# Patient Record
Sex: FEMALE | Race: BLACK OR AFRICAN AMERICAN | Employment: FULL TIME | URBAN - METROPOLITAN AREA
[De-identification: names, ages, dates, MRNs, and addresses within clinical notes are randomized per-mention and may not be internally consistent; named-entity substitution may affect disease eponyms.]

---

## 2020-09-15 ENCOUNTER — APPOINTMENT (OUTPATIENT)
Dept: ULTRASOUND IMAGING | Age: 35
End: 2020-09-15
Payer: MEDICAID

## 2020-09-15 ENCOUNTER — HOSPITAL ENCOUNTER (EMERGENCY)
Age: 35
Discharge: HOME | End: 2020-09-15
Attending: STUDENT IN AN ORGANIZED HEALTH CARE EDUCATION/TRAINING PROGRAM
Payer: MEDICAID

## 2020-09-15 ENCOUNTER — APPOINTMENT (OUTPATIENT)
Dept: CT IMAGING | Age: 35
End: 2020-09-15
Payer: MEDICAID

## 2020-09-15 VITALS
OXYGEN SATURATION: 98 % | HEART RATE: 84 BPM | TEMPERATURE: 98.2 F | RESPIRATION RATE: 17 BRPM | SYSTOLIC BLOOD PRESSURE: 127 MMHG | DIASTOLIC BLOOD PRESSURE: 87 MMHG

## 2020-09-15 LAB
ALBUMIN SERPL-MCNC: 4.5 G/DL (ref 3.4–5)
ALP BLD-CCNC: 46 U/L (ref 40–129)
ALT SERPL-CCNC: <5 U/L (ref 10–40)
ANION GAP SERPL CALCULATED.3IONS-SCNC: 12 MMOL/L (ref 3–16)
AST SERPL-CCNC: 15 U/L (ref 15–37)
BACTERIA: ABNORMAL /HPF
BASE EXCESS VENOUS: 1.1 MMOL/L (ref -3–3)
BASOPHILS ABSOLUTE: 0.1 K/UL (ref 0–0.2)
BASOPHILS RELATIVE PERCENT: 0.8 %
BILIRUB SERPL-MCNC: <0.2 MG/DL (ref 0–1)
BILIRUBIN DIRECT: <0.2 MG/DL (ref 0–0.3)
BILIRUBIN URINE: NEGATIVE
BILIRUBIN, INDIRECT: ABNORMAL MG/DL (ref 0–1)
BLOOD, URINE: NEGATIVE
BUN BLDV-MCNC: 10 MG/DL (ref 7–20)
CALCIUM SERPL-MCNC: 9.1 MG/DL (ref 8.3–10.6)
CHLORIDE BLD-SCNC: 101 MMOL/L (ref 99–110)
CLARITY: CLEAR
CO2: 26 MMOL/L (ref 21–32)
COLOR: YELLOW
CREAT SERPL-MCNC: <0.5 MG/DL (ref 0.6–1.1)
EOSINOPHILS ABSOLUTE: 0 K/UL (ref 0–0.6)
EOSINOPHILS RELATIVE PERCENT: 0.4 %
EPITHELIAL CELLS, UA: ABNORMAL /HPF (ref 0–5)
GFR AFRICAN AMERICAN: >60
GFR NON-AFRICAN AMERICAN: >60
GLUCOSE BLD-MCNC: 98 MG/DL (ref 70–99)
GLUCOSE URINE: NEGATIVE MG/DL
HCG QUALITATIVE: NEGATIVE
HCO3 VENOUS: 26.8 MMOL/L (ref 23–29)
HCT VFR BLD CALC: 34.2 % (ref 36–48)
HEMOGLOBIN: 11.4 G/DL (ref 12–16)
KETONES, URINE: NEGATIVE MG/DL
LACTIC ACID: 0.9 MMOL/L (ref 0.4–2)
LEUKOCYTE ESTERASE, URINE: ABNORMAL
LIPASE: 22 U/L (ref 13–60)
LYMPHOCYTES ABSOLUTE: 1.3 K/UL (ref 1–5.1)
LYMPHOCYTES RELATIVE PERCENT: 18.2 %
MCH RBC QN AUTO: 29 PG (ref 26–34)
MCHC RBC AUTO-ENTMCNC: 33.4 G/DL (ref 31–36)
MCV RBC AUTO: 86.9 FL (ref 80–100)
MICROSCOPIC EXAMINATION: YES
MONOCYTES ABSOLUTE: 0.3 K/UL (ref 0–1.3)
MONOCYTES RELATIVE PERCENT: 4.9 %
MUCUS: ABNORMAL /LPF
NEUTROPHILS ABSOLUTE: 5.3 K/UL (ref 1.7–7.7)
NEUTROPHILS RELATIVE PERCENT: 75.7 %
NITRITE, URINE: NEGATIVE
O2 SAT, VEN: 63 %
O2 THERAPY: ABNORMAL
PCO2, VEN: 49.1 MMHG (ref 40–50)
PDW BLD-RTO: 17 % (ref 12.4–15.4)
PH UA: 5.5 (ref 5–8)
PH VENOUS: 7.34 (ref 7.35–7.45)
PLATELET # BLD: 304 K/UL (ref 135–450)
PMV BLD AUTO: 8.3 FL (ref 5–10.5)
PO2, VEN: 34.9 MMHG (ref 25–40)
POTASSIUM REFLEX MAGNESIUM: 4.3 MMOL/L (ref 3.5–5.1)
PROTEIN UA: NEGATIVE MG/DL
RBC # BLD: 3.93 M/UL (ref 4–5.2)
RBC UA: ABNORMAL /HPF (ref 0–4)
SODIUM BLD-SCNC: 139 MMOL/L (ref 136–145)
SPECIFIC GRAVITY UA: >=1.03 (ref 1–1.03)
TCO2 CALC VENOUS: 28 MMOL/L
TOTAL PROTEIN: 7.7 G/DL (ref 6.4–8.2)
URINE TRICHOMONAS EVALUATION: NORMAL
URINE TYPE: ABNORMAL
UROBILINOGEN, URINE: 0.2 E.U./DL
WBC # BLD: 7 K/UL (ref 4–11)
WBC UA: ABNORMAL /HPF (ref 0–5)

## 2020-09-15 PROCEDURE — 96372 THER/PROPH/DIAG INJ SC/IM: CPT

## 2020-09-15 PROCEDURE — 76856 US EXAM PELVIC COMPLETE: CPT

## 2020-09-15 PROCEDURE — 83605 ASSAY OF LACTIC ACID: CPT

## 2020-09-15 PROCEDURE — 81001 URINALYSIS AUTO W/SCOPE: CPT

## 2020-09-15 PROCEDURE — 84703 CHORIONIC GONADOTROPIN ASSAY: CPT

## 2020-09-15 PROCEDURE — 87591 N.GONORRHOEAE DNA AMP PROB: CPT

## 2020-09-15 PROCEDURE — 76830 TRANSVAGINAL US NON-OB: CPT

## 2020-09-15 PROCEDURE — 80076 HEPATIC FUNCTION PANEL: CPT

## 2020-09-15 PROCEDURE — 2580000003 HC RX 258: Performed by: STUDENT IN AN ORGANIZED HEALTH CARE EDUCATION/TRAINING PROGRAM

## 2020-09-15 PROCEDURE — 82803 BLOOD GASES ANY COMBINATION: CPT

## 2020-09-15 PROCEDURE — 6370000000 HC RX 637 (ALT 250 FOR IP): Performed by: STUDENT IN AN ORGANIZED HEALTH CARE EDUCATION/TRAINING PROGRAM

## 2020-09-15 PROCEDURE — 80048 BASIC METABOLIC PNL TOTAL CA: CPT

## 2020-09-15 PROCEDURE — 83690 ASSAY OF LIPASE: CPT

## 2020-09-15 PROCEDURE — 99284 EMERGENCY DEPT VISIT MOD MDM: CPT

## 2020-09-15 PROCEDURE — 74177 CT ABD & PELVIS W/CONTRAST: CPT

## 2020-09-15 PROCEDURE — 36415 COLL VENOUS BLD VENIPUNCTURE: CPT

## 2020-09-15 PROCEDURE — 85025 COMPLETE CBC W/AUTO DIFF WBC: CPT

## 2020-09-15 PROCEDURE — 87491 CHLMYD TRACH DNA AMP PROBE: CPT

## 2020-09-15 PROCEDURE — 6360000004 HC RX CONTRAST MEDICATION: Performed by: STUDENT IN AN ORGANIZED HEALTH CARE EDUCATION/TRAINING PROGRAM

## 2020-09-15 PROCEDURE — 6360000002 HC RX W HCPCS: Performed by: STUDENT IN AN ORGANIZED HEALTH CARE EDUCATION/TRAINING PROGRAM

## 2020-09-15 RX ORDER — CEFTRIAXONE SODIUM 250 MG/1
250 INJECTION, POWDER, FOR SOLUTION INTRAMUSCULAR; INTRAVENOUS ONCE
Status: COMPLETED | OUTPATIENT
Start: 2020-09-15 | End: 2020-09-15

## 2020-09-15 RX ORDER — PREGABALIN 75 MG/1
75 CAPSULE ORAL 2 TIMES DAILY
Status: DISCONTINUED | OUTPATIENT
Start: 2020-09-15 | End: 2020-09-15

## 2020-09-15 RX ORDER — DIAZEPAM 5 MG/1
5 TABLET ORAL ONCE
Status: COMPLETED | OUTPATIENT
Start: 2020-09-15 | End: 2020-09-15

## 2020-09-15 RX ORDER — 0.9 % SODIUM CHLORIDE 0.9 %
1000 INTRAVENOUS SOLUTION INTRAVENOUS ONCE
Status: COMPLETED | OUTPATIENT
Start: 2020-09-15 | End: 2020-09-15

## 2020-09-15 RX ORDER — ACETAMINOPHEN 500 MG
1000 TABLET ORAL ONCE
Status: COMPLETED | OUTPATIENT
Start: 2020-09-15 | End: 2020-09-15

## 2020-09-15 RX ORDER — DOXYCYCLINE HYCLATE 100 MG
100 TABLET ORAL 2 TIMES DAILY
Qty: 28 TABLET | Refills: 0 | Status: SHIPPED | OUTPATIENT
Start: 2020-09-15 | End: 2020-09-29

## 2020-09-15 RX ORDER — METRONIDAZOLE 500 MG/1
2000 TABLET ORAL ONCE
Status: COMPLETED | OUTPATIENT
Start: 2020-09-15 | End: 2020-09-15

## 2020-09-15 RX ORDER — AZITHROMYCIN 500 MG/1
1000 TABLET, FILM COATED ORAL ONCE
Status: COMPLETED | OUTPATIENT
Start: 2020-09-15 | End: 2020-09-15

## 2020-09-15 RX ORDER — IBUPROFEN 800 MG/1
800 TABLET ORAL EVERY 8 HOURS PRN
Qty: 30 TABLET | Refills: 0 | Status: SHIPPED | OUTPATIENT
Start: 2020-09-15 | End: 2021-07-07 | Stop reason: ALTCHOICE

## 2020-09-15 RX ORDER — HYDROCODONE BITARTRATE AND ACETAMINOPHEN 5; 325 MG/1; MG/1
2 TABLET ORAL ONCE
Status: COMPLETED | OUTPATIENT
Start: 2020-09-15 | End: 2020-09-15

## 2020-09-15 RX ORDER — KETOROLAC TROMETHAMINE 30 MG/ML
30 INJECTION, SOLUTION INTRAMUSCULAR; INTRAVENOUS ONCE
Status: COMPLETED | OUTPATIENT
Start: 2020-09-15 | End: 2020-09-15

## 2020-09-15 RX ORDER — LIDOCAINE 4 G/G
1 PATCH TOPICAL ONCE
Status: DISCONTINUED | OUTPATIENT
Start: 2020-09-15 | End: 2020-09-15 | Stop reason: HOSPADM

## 2020-09-15 RX ADMIN — SODIUM CHLORIDE 1000 ML: 9 INJECTION, SOLUTION INTRAVENOUS at 13:19

## 2020-09-15 RX ADMIN — METRONIDAZOLE 2000 MG: 500 TABLET ORAL at 16:20

## 2020-09-15 RX ADMIN — AZITHROMYCIN 1000 MG: 500 TABLET, FILM COATED ORAL at 16:20

## 2020-09-15 RX ADMIN — HYDROCODONE BITARTRATE AND ACETAMINOPHEN 2 TABLET: 5; 325 TABLET ORAL at 16:20

## 2020-09-15 RX ADMIN — CEFTRIAXONE SODIUM 250 MG: 250 INJECTION, POWDER, FOR SOLUTION INTRAMUSCULAR; INTRAVENOUS at 16:20

## 2020-09-15 RX ADMIN — DIAZEPAM 5 MG: 5 TABLET ORAL at 11:44

## 2020-09-15 RX ADMIN — IOVERSOL 100 ML: 678 INJECTION INTRA-ARTERIAL; INTRAVENOUS at 14:44

## 2020-09-15 RX ADMIN — ACETAMINOPHEN 1000 MG: 500 TABLET, FILM COATED ORAL at 11:44

## 2020-09-15 RX ADMIN — KETOROLAC TROMETHAMINE 30 MG: 30 INJECTION, SOLUTION INTRAMUSCULAR at 11:44

## 2020-09-15 ASSESSMENT — PAIN SCALES - GENERAL
PAINLEVEL_OUTOF10: 9
PAINLEVEL_OUTOF10: 5
PAINLEVEL_OUTOF10: 10
PAINLEVEL_OUTOF10: 9

## 2020-09-15 ASSESSMENT — PAIN DESCRIPTION - ORIENTATION
ORIENTATION: LEFT
ORIENTATION: LEFT
ORIENTATION: LOWER
ORIENTATION: LOWER
ORIENTATION: LEFT

## 2020-09-15 ASSESSMENT — PAIN DESCRIPTION - LOCATION
LOCATION: ABDOMEN
LOCATION: ABDOMEN
LOCATION: LEG;ABDOMEN
LOCATION: ABDOMEN;LEG
LOCATION: LEG

## 2020-09-15 ASSESSMENT — PAIN DESCRIPTION - PAIN TYPE
TYPE: ACUTE PAIN

## 2020-09-15 ASSESSMENT — PAIN DESCRIPTION - DESCRIPTORS
DESCRIPTORS: ACHING;SHARP
DESCRIPTORS: ACHING
DESCRIPTORS: ACHING;SHARP
DESCRIPTORS: ACHING;SHARP
DESCRIPTORS: SHARP;ACHING

## 2020-09-15 ASSESSMENT — ENCOUNTER SYMPTOMS
SHORTNESS OF BREATH: 0
BACK PAIN: 0
COLOR CHANGE: 0
BLOOD IN STOOL: 0
ABDOMINAL DISTENTION: 0
DIARRHEA: 0
VOMITING: 0
ABDOMINAL PAIN: 1
NAUSEA: 0
CONSTIPATION: 0

## 2020-09-15 NOTE — ED NOTES
Per Dr. Mark Wolf hold the norco due to pt being transferred to [facility]  09/15/20 5468

## 2020-09-15 NOTE — ED NOTES
Pt states she has a ride on the way, Dr. Wendy donahue with giving pt pain medication     Jenise Slaughter RN  09/15/20 5686

## 2020-09-15 NOTE — ED PROVIDER NOTES
**ADVANCED PRACTICE PROVIDER, I HAVE EVALUATED THIS PATIENT**        [facility] ENCOUNTER      Pt Name: Keily Soriano  DTL:[id number]  [place] 1985  Date of evaluation: 9/15/2020  Provider: AUDI Dougherty CNP      Chief Complaint:    Chief Complaint   Patient presents with    Leg Pain     L leg, started a few days ago but today it shot up to her abdomen       Nursing Notes, Past Medical Hx, Past Surgical Hx, Social Hx, Allergies, and Family Hx were all reviewed and agreed with or any disagreements were addressed in the HPI.    HPI:  (Location, Duration, Timing, Severity, Quality, Assoc Sx, Context, Modifying factors)  This is a  27 y.o. female with no known medical history who presents to the emergency department today as a transfer from [facility] emergency department for pelvic ultrasound to rule out torsion vs abscess secondary to having left adnexal tenderness on pelvic exam.  In brief history patient has had a 2-week history of left thigh pain. It is worse with movements as well as ambulation and weightbearing. Today the pain moved up into her LLQ. No nausea or vomiting. Normal bowel movements. No vaginal discharge or bleeding. No burning with urination or blood in urine. No fevers. No skin changes. PastMedical/Surgical History:  History reviewed. No pertinent past medical history. History reviewed. No pertinent surgical history. Medications:  Previous Medications    No medications on file         Review of Systems:  Review of Systems   Constitutional: Negative for chills, diaphoresis and fever. Respiratory: Negative for shortness of breath. Cardiovascular: Negative for chest pain. Gastrointestinal: Positive for abdominal pain (LLQ). Negative for abdominal distention, blood in stool, constipation, diarrhea, nausea and vomiting. Genitourinary: Positive for pelvic pain (left).  Negative for dysuria, frequency, genital sores, hematuria, vaginal bleeding, vaginal discharge and vaginal pain. Musculoskeletal: Positive for myalgias (left thigh). Negative for back pain, gait problem and joint swelling. Skin: Negative for color change, rash and wound. Allergic/Immunologic: Negative for immunocompromised state. Hematological: Negative for adenopathy. Psychiatric/Behavioral: Negative for confusion. All other systems reviewed and are negative. Positives and Pertinent negatives as per HPI. Except as noted above in the ROS, problem specific ROS was completed and is negative. Physical Exam:  Physical Exam  Vitals signs and nursing note reviewed. Constitutional:       General: She is not in acute distress. Appearance: Normal appearance. She is well-developed. She is not toxic-appearing. HENT:      Head: Normocephalic and atraumatic. Eyes:      General: No scleral icterus. Conjunctiva/sclera: Conjunctivae normal.   Neck:      Musculoskeletal: Normal range of motion. Vascular: No JVD. Cardiovascular:      Rate and Rhythm: Normal rate and regular rhythm. Heart sounds: Normal heart sounds. Pulmonary:      Effort: Pulmonary effort is normal. No respiratory distress. Breath sounds: Normal breath sounds. Abdominal:      General: Bowel sounds are normal. There is no distension. Palpations: Abdomen is soft. Abdomen is not rigid. Tenderness: There is abdominal tenderness. There is no left CVA tenderness, guarding or rebound. Musculoskeletal: Normal range of motion. Left upper leg: She exhibits tenderness. She exhibits no swelling. Legs:    Skin:     General: Skin is warm and dry. Capillary Refill: Capillary refill takes less than 2 seconds. Findings: No erythema or rash. Neurological:      General: No focal deficit present. Mental Status: She is alert and oriented to person, place, and time. Cranial Nerves: Cranial nerves are intact. Psychiatric:         Mood and Affect: Mood normal.         MEDICAL DECISION MAKING    Vitals:    Vitals:    09/15/20 1107 09/15/20 1454 09/15/20 1613   BP: (!) 149/104 129/89 (!) 126/97   Pulse: 84 87 93   Resp: 20 18 18   Temp: 98.4 °F (36.9 °C)     TempSrc: Oral     SpO2: 99% 98% 99%       LABS:  Labs Reviewed   URINALYSIS - Abnormal; Notable for the following components:       Result Value    Leukocyte Esterase, Urine TRACE (*)     All other components within normal limits    Narrative:     Performed at:  [facility]  [address] [place] [place], [place]   Phone [phone]   CBC WITH AUTO DIFFERENTIAL - Abnormal; Notable for the following components:    RBC 3.93 (*)     Hemoglobin 11.4 (*)     Hematocrit 34.2 (*)     RDW 17.0 (*)     All other components within normal limits    Narrative:     Performed at:  [facility]  [address] [place], [place]   Phone [phone]   BASIC METABOLIC PANEL W/ REFLEX TO MG FOR LOW K - Abnormal; Notable for the following components:    CREATININE <0.5 (*)     All other components within normal limits    Narrative:     Performed at:  [facility]  [address] [place] [place], [place]   Phone [phone]   HEPATIC FUNCTION PANEL - Abnormal; Notable for the following components:    ALT <5 (*)     All other components within normal limits    Narrative:     Performed at:  [facility]  [address] [place] [place], [place]   Phone [phone]   BLOOD GAS, VENOUS - Abnormal; Notable for the following components:    pH, [place] 7.344 (*)     All other components within normal limits    Narrative:     Performed at:  [facility]  [address] [place], [place]   Phone [phone]   MICROSCOPIC URINALYSIS - Abnormal; Notable for the Multiplanar reformatted images are provided for review. Dose modulation, iterative reconstruction, and/or weight based adjustment of the mA/kV was utilized to reduce the radiation dose to as low as reasonably achievable. COMPARISON: None. HISTORY: ORDERING SYSTEM PROVIDED HISTORY: L flank tenderness, LLQ tenderness TECHNOLOGIST PROVIDED HISTORY: Reason for exam:->L flank tenderness, LLQ tenderness Additional Contrast?->None Reason for Exam: PT. C/O RADIATING PAIN ACROSS LOWER ABD THAT RADIATES  TO LT. LOWER BACK AND DOWN LT. LEG X 1-2 WEEKS. DENIES N/V/D Acuity: Acute Type of Exam: Initial Relevant Medical/Surgical History: NO HX OF ABD PROBLEMS, NO  HX OF SURGERY TO ABD, NO HX OF CA FINDINGS: Lower Chest: There is bibasilar atelectasis. Organs: The liver, spleen, pancreas, adrenal glands and kidneys are unremarkable. GI/Bowel: There is no bowel obstruction. The appendix is within normal limits. Pelvis: The pelvic viscera are within normal limits. There is a small fat containing supraumbilical hernia. Peritoneum/Retroperitoneum: There is no adenopathy. A trace amount of physiologic free fluid is present in the cul de sac. Bones/Soft Tissues: Degenerative changes involve the thoracic spine. 1. No acute abnormality.           MEDICAL DECISION MAKING / ED COURSE:      PROCEDURES:   Procedures    None    Patient was given:  Medications   lidocaine 4 % external patch 1 patch (1 patch Transdermal Patch Applied 9/15/20 1144)   ketorolac (TORADOL) injection 30 mg (30 mg Intramuscular Given 9/15/20 1144)   acetaminophen (TYLENOL) tablet 1,000 mg (1,000 mg Oral Given 9/15/20 1144)   diazePAM (VALIUM) tablet 5 mg (5 mg Oral Given 9/15/20 1144)   0.9 % sodium chloride bolus (0 mLs Intravenous Stopped 9/15/20 1455)   ioversol (OPTIRAY) 68 % injection 100 mL (100 mLs Intravenous Given 9/15/20 1444)   HYDROcodone-acetaminophen (NORCO) 5-325 MG per tablet 2 tablet (2 tablets Oral Given 9/15/20 1620)   cefTRIAXone (ROCEPHIN) injection 250 mg (250 mg Intramuscular Given 9/15/20 1620)   azithromycin (ZITHROMAX) tablet 1,000 mg (1,000 mg Oral Given 9/15/20 1620)   metroNIDAZOLE (FLAGYL) tablet 2,000 mg (2,000 mg Oral Given 9/15/20 1620)       Differential diagnosis: Abdominal Aortic Aneurysm, Ischemic Bowel, Bowel Obstruction, Appendicitis, Diverticulitis, Pyelonephritis, UTI, STD, Ureterolithiasis, Colitis, Gonad Torsion, other    Patient seen and examined today for left thigh and LLQ abdominal pain. See HPI for patient presentation. Patient is hemodynamically stable, nontoxic, afebrile, and without tachycardia, tachypnea, and hypoxia. Physical exam as above. 78-year-old lying in bed in no acute distress. She has tenderness to the left upper thigh. No skin changes. Muscle soft. Full ROM of all joints. No tenderness with palpation of the left hip. Distal extremity pink and well-perfused. No neurovascular deficits. She endorses LLQ abdominal/pelvic TTP. Abdomen soft without rigidity guarding or peritoneal signs. No CVA tenderness. Urine shows no infection blood or ketones. Pregnancy negative. CBC and chemistry unremarkable. Urine trichomonas negative. CT abdomen pelvis with IV contrast shows no acute abnormality. Pelvic ultrasound is completely normal.    Emergency department course included several medications at outlying facility. See MAR for these notes. See previous attending provider's note also. She was treated prophylactically for gonorrhea chlamydia and trichomonas. Urine shows no infection. Abdomen nonsurgical.  She is not septic. Vitals and work-up very reassuring. I will prescribe doxycycline for PID due to the adnexal tenderness and lack of other diagnosis. She was given pain medication and discharged home in stable condition with strict return precautions. At this time, the evidence for any other entities in the differential is insufficient to justify any further testing.   This was explained to the patient. The patient was advised that persistent or worsening symptoms will require further evaluation. I discussed with Flores Denton and/or family the exam results, diagnosis, care, prognosis, reasons to return and the importance of follow up. Patient and/or family is in full agreement with plan and all questions have been answered. Specific discharge instructions explained, including reasons to return to the emergency department. Flores Denton is well appearing, non-toxic, and afebrile at the time of discharge. Patient was instructed to follow up with primary care provider in 24-48 hours, and to instructed to return to ED immediately for any new or worsening concerns. Flores Denton verbalized understanding and discharged home. The patient tolerated their visit well. I evaluated the patient. The physician was available for consultation as needed. The patient and / or the family were informed of the results of any tests, a time was given to answer questions, a plan was proposed and they agreed with plan. CLINICAL IMPRESSION:  1. Abdominal pain, left lower quadrant    2.  Left adnexal tenderness        DISPOSITION Decision To Discharge 09/15/2020 06:56:27 PM      PATIENT REFERRED TO:  Damon Johnson MD  [address]  [address] [address] [address]  [phone]    Schedule an appointment as soon as possible for a visit       [facility]    Schedule an appointment as soon as possible for a visit       [facility]  [address] [address]  [phone]  Go to   As needed      DISCHARGE MEDICATIONS:  New Prescriptions    DOXYCYCLINE HYCLATE (VIBRA-TABS) 100 MG TABLET    Take 1 tablet by mouth 2 times daily for 14 days    IBUPROFEN (ADVIL;MOTRIN) 800 MG TABLET    Take 1 tablet by mouth every 8 hours as needed for Pain       DISCONTINUED MEDICATIONS:  Discontinued Medications    No medications on file              (Please note the MDM and HPI sections of this note were completed with a voice recognition program.  Efforts were made to edit the dictations but occasionally words are mis-transcribed.)    Electronically signed, AUDI Martínez CNP,           AUDI Martínez CNP  09/15/20 2120

## 2020-09-15 NOTE — ED NOTES
Pt ambulatory from ER, this RN witnessed this pt get into a car with someone other than her driving     Yennifer Huang, KAMERON  09/15/20 7323

## 2020-09-15 NOTE — ED PROVIDER NOTES
[address] ENCOUNTER      Pt Name: Bobo Perez  MRN: [id number]  [place] 1985  Date of evaluation: 9/15/2020  Provider: Rohit Bhat MD    [address] [place] [address]       Chief Complaint   Patient presents with    Leg Pain     L leg, started a few days ago but today it shot up to her abdomen         HISTORY OF PRESENT ILLNESS   (Location/Symptom, Timing/Onset,Context/Setting, Quality, Duration, Modifying Factors, Severity)  Note limiting factors. Bobo Perez is a 27 y.o. female who presents to the emergency department with LLQ abdominal pain and L lower back pain that radiates down into her pelvis and also her left leg, states she felt discomfort in her back and L leg pain 2 days ago, but today while at work, she had shooting pain into her pelvis that occurred abruptly. Describes her pain as 10/10, sharp and stabbing, worst in the LLQ. Denies fever, chills, nausea, vomiting, dysuria, frequency, vaginal bleeding, vaginal discharge, last intercourse 2 weeks ago, h/o BTL. Symptoms not otherwise alleviated or exacerbated by other factors. NursingNotes were reviewed. REVIEW OF SYSTEMS    (2-9 systems for level 4, 10 or more for level 5)       Constitutional: No fever or chills. Eye: No visual disturbances. No eye pain. Ear/Nose/Mouth/Throat: No nasal congestion. No sore throat. Respiratory: No cough, No shortness of breath, No sputum production. Cardiovascular: No chest pain. No palpitations. Gastrointestinal: + abdominal pain. No nausea or vomiting  Genitourinary: No dysuria. No hematuria. Hematology/Lymphatics: No bleeding or bruising tendency. Immunologic: No malaise. No swollen glands. Musculoskeletal: No back pain. No joint pain. Integumentary: No rash. No abrasions. Neurologic: No headache. No focal numbness or weakness. PAST MEDICAL HISTORY   History reviewed. No pertinent past medical history.       SURGICALHISTORY History reviewed. No pertinent surgical history. CURRENT MEDICATIONS     Denies    ALLERGIES     Patient has no known allergies. FAMILY HISTORY     History reviewed. No pertinent family history. SOCIAL HISTORY       Social History     Socioeconomic History    Marital status: Single     Spouse name: None    Number of children: None    Years of education: None    Highest education level: None   Occupational History    None   Social Needs    Financial resource strain: None    Food insecurity     Worry: None     Inability: None    Transportation needs     Medical: None     Non-medical: None   Tobacco Use    Smoking status: Current Every Day Smoker     Packs/day: 0.25     Types: Cigarettes    Smokeless tobacco: Never Used   Substance and Sexual Activity    Alcohol use: Yes     Comment: Occ    Drug use: None    Sexual activity: None   Lifestyle    Physical activity     Days per week: None     Minutes per session: None    Stress: None   Relationships    Social connections     Talks on phone: None     Gets together: None     Attends Hindu service: None     Active member of club or organization: None     Attends meetings of clubs or organizations: None     Relationship status: None    Intimate partner violence     Fear of current or ex partner: None     Emotionally abused: None     Physically abused: None     Forced sexual activity: None   Other Topics Concern    None   Social History Narrative    None       SCREENINGS             PHYSICAL EXAM    (up to 7 for level 4, 8 or more for level 5)     ED Triage Vitals [09/15/20 1107]   BP Temp Temp Source Pulse Resp SpO2 Height Weight   (!) 149/104 98.4 °F (36.9 °C) Oral 84 20 99 % -- --       General: Alert and oriented appropriately for age, No acute distress. Eye: Normal conjunctiva. Pupils equal and reactive. HENT: Oral mucosa is moist.  Respiratory: Respirations even and non-labored.   Cardiovascular: Normal rate, Regular rhythm. Gastrointestinal: Soft, ttp in the LLQ, +ve L CVAT, Non-distended.  exam performed by AUSTIN Rodríguez with chaperone:  Normal external female genitaliea, vaginal vault with scant amount of blood and mucoid discharge, cervix with slight tenderness, +friable tissue, significant L adnexal tenderness, moderate uterine tenderness without adnexal tenderness on the right. No masses palpated. Musculoskeletal: No swelling. Thoracic and lumbar vertebrae nttp. No stepoffs, paraspinal tenderness on the left. Integumentary: Warm, Dry. Neurologic: Alert and appropriate for age. No focal deficits. Psychiatric: Cooperative. DIAGNOSTIC RESULTS     RADIOLOGY:   Non-plain filmimages such as CT, Ultrasound and MRI are read by the radiologist. Plain radiographic images are visualized and preliminarily interpreted by the emergency physician with the below findings:      Interpretation per the Radiologist below, if available at the time ofthis note:    CT ABDOMEN PELVIS W IV CONTRAST Additional Contrast? None   Final Result   1. No acute abnormality.              LABS:  Labs Reviewed   URINALYSIS - Abnormal; Notable for the following components:       Result Value    Leukocyte Esterase, Urine TRACE (*)     All other components within normal limits    Narrative:     Performed at:  [facility]  [address] [facility]   Phone [phone]   CBC WITH AUTO DIFFERENTIAL - Abnormal; Notable for the following components:    RBC 3.93 (*)     Hemoglobin 11.4 (*)     Hematocrit 34.2 (*)     RDW 17.0 (*)     All other components within normal limits    Narrative:     Performed at:  [facility]  [address] [place], [facility]   Phone [phone]   [address] MG FOR LOW K - Abnormal; Notable for the following components:    CREATININE <0.5 (*)     All other components within normal limits Narrative:     Performed at:  [facility]  [address] [place], [place]   Phone [phone]   HEPATIC FUNCTION PANEL - Abnormal; Notable for the following components:    ALT <5 (*)     All other components within normal limits    Narrative:     Performed at:  [facility]  [address] [place], [place]   Phone [phone]   BLOOD GAS, VENOUS - Abnormal; Notable for the following components:    pH, [place] 7.344 (*)     All other components within normal limits    Narrative:     Performed at:  [facility]  [address] [place], [place]   Phone [phone]   MICROSCOPIC URINALYSIS - Abnormal; Notable for the following components:    Mucus, UA 1+ (*)     Bacteria, UA 2+ (*)     All other components within normal limits    Narrative:     Performed at:  [facility]  [address] [place], [place]   Phone [phone]   C.TRACHOMATIS [place] DNA, URINE   HCG, SERUM, QUALITATIVE    Narrative:     Performed at:  [facility]  [address] [place], [place]   Phone [phone]   LIPASE    Narrative:     Performed at:  [address] [place] [address] Rd [facility]  [address] [place], [place]   Phone [phone]   LACTIC ACID, PLASMA    Narrative:     Performed at:  [facility]  [address] [place], [place]   Phone [phone]   URINE TRICHOMONAS EVALUATION       All other labs were within normal range or not returned as of this dictation.     EMERGENCY DEPARTMENT COURSE and DIFFERENTIAL DIAGNOSIS/MDM:   Vitals:    Vitals:    09/15/20 1107 09/15/20 1454 09/15/20 1613   BP: (!) 149/104 129/89 (!) 126/97   Pulse: 84 87 93   Resp: 20 18 18   Temp: 98.4 °F (36.9 °C) TempSrc: Oral     SpO2: 99% 98% 99%         Medical decision makin yo F pt with LLQ abd pain since this morning preceded by 2-3 days pain radiating from her pelvis down her L thigh, pain abrupt onset and acutely worse while she was working earlier today. Associated with L paraspinal lumbar tenderness as well, pt does not remember recent trauma, though pain is in the distribution of radicular lumbar pain, her degree of LLQ tenderness c/f acute intraabdominal process especially after multimodal pain control did not relieve her pain. Getting CT, labs as ordered and resulted above. CT negative acute, however, pt still c/o significant pain, still with tenderness in the LLQ, possible ovarian cyst rupture vs TOA vs less likely torsion given description of the pain. On pelvic examination during reevaluation, pt with significant L sided adnexal tenderness c/f acute pelvic process, necessitating US workup. Given discharge and cervical friability, bleeding in the vault, administered abx to cover for STI, transferred to [facility] for [address]. Remains HDS in the department, given Norco as pt with private vehicle transport with another person driving. FINAL IMPRESSION      1.  Left adnexal tenderness          DISPOSITION/PLAN   DISPOSITION Decision To Transfer 09/15/2020 04:11:45 PM        (Please note that portions of this note were completed with a voice recognition program.Efforts were made to edit the dictations but occasionally words are mis-transcribed.)    Scotty Connelly MD (electronically signed)  Attending Emergency Physician        Scotty Connelly MD  09/15/20 3627

## 2020-09-16 LAB
C. TRACHOMATIS DNA ,URINE: NEGATIVE
N. GONORRHOEAE DNA, URINE: NEGATIVE

## 2021-07-07 ENCOUNTER — HOSPITAL ENCOUNTER (EMERGENCY)
Age: 36
Discharge: HOME | End: 2021-07-07
Attending: EMERGENCY MEDICINE
Payer: COMMERCIAL

## 2021-07-07 VITALS
SYSTOLIC BLOOD PRESSURE: 152 MMHG | DIASTOLIC BLOOD PRESSURE: 100 MMHG | WEIGHT: 204.37 LBS | TEMPERATURE: 98.2 F | OXYGEN SATURATION: 100 % | HEART RATE: 88 BPM | RESPIRATION RATE: 18 BRPM

## 2021-07-07 DIAGNOSIS — R51.9 ACUTE NONINTRACTABLE HEADACHE, UNSPECIFIED HEADACHE TYPE: Primary | ICD-10-CM

## 2021-07-07 PROCEDURE — 6370000000 HC RX 637 (ALT 250 FOR IP): Performed by: EMERGENCY MEDICINE

## 2021-07-07 PROCEDURE — 99284 EMERGENCY DEPT VISIT MOD MDM: CPT

## 2021-07-07 RX ORDER — IBUPROFEN 600 MG/1
600 TABLET ORAL ONCE
Status: COMPLETED | OUTPATIENT
Start: 2021-07-07 | End: 2021-07-07

## 2021-07-07 RX ORDER — IBUPROFEN 600 MG/1
600 TABLET ORAL EVERY 8 HOURS PRN
Qty: 15 TABLET | Refills: 0 | Status: SHIPPED | OUTPATIENT
Start: 2021-07-07 | End: 2021-07-12

## 2021-07-07 RX ORDER — ACETAMINOPHEN 500 MG
500 TABLET ORAL 4 TIMES DAILY PRN
Qty: 20 TABLET | Refills: 0 | Status: SHIPPED | OUTPATIENT
Start: 2021-07-07 | End: 2021-07-12

## 2021-07-07 RX ORDER — ACETAMINOPHEN 325 MG/1
650 TABLET ORAL ONCE
Status: COMPLETED | OUTPATIENT
Start: 2021-07-07 | End: 2021-07-07

## 2021-07-07 RX ADMIN — IBUPROFEN 600 MG: 600 TABLET, FILM COATED ORAL at 10:40

## 2021-07-07 RX ADMIN — ACETAMINOPHEN 650 MG: 325 TABLET ORAL at 10:41

## 2021-07-07 ASSESSMENT — PAIN DESCRIPTION - DESCRIPTORS: DESCRIPTORS: ACHING;NUMBNESS

## 2021-07-07 ASSESSMENT — PAIN DESCRIPTION - ORIENTATION: ORIENTATION: LEFT

## 2021-07-07 ASSESSMENT — ENCOUNTER SYMPTOMS
SHORTNESS OF BREATH: 0
FACIAL SWELLING: 0
TROUBLE SWALLOWING: 0
ABDOMINAL PAIN: 0
WHEEZING: 0
COLOR CHANGE: 0
VOMITING: 0
VOICE CHANGE: 0
STRIDOR: 0
NAUSEA: 0

## 2021-07-07 ASSESSMENT — PAIN SCALES - GENERAL
PAINLEVEL_OUTOF10: 8

## 2021-07-07 ASSESSMENT — PAIN DESCRIPTION - ONSET: ONSET: PROGRESSIVE

## 2021-07-07 ASSESSMENT — PAIN DESCRIPTION - LOCATION: LOCATION: ARM

## 2021-07-07 ASSESSMENT — PAIN DESCRIPTION - PAIN TYPE: TYPE: ACUTE PAIN

## 2021-07-07 ASSESSMENT — PAIN DESCRIPTION - PROGRESSION: CLINICAL_PROGRESSION: GRADUALLY WORSENING

## 2021-07-07 NOTE — ED PROVIDER NOTES
[address] [facility]  eMERGENCY dEPARTMENT eNCOUnter      Pt Name: Sierra Thompson  MRN: [id number]  [place] 1985  Date of evaluation: 7/7/2021  Provider: Nurys Washington MD    [address] [place] [address]       Chief Complaint   Patient presents with    Other     c/o pain with numbness lf upper arm past 2 days    Headache     past 24hrs         HISTORY OF PRESENT ILLNESS   (Location/Symptom, Timing/Onset, Context/Setting, Quality, Duration, Modifying Factors, Severity)  Note limiting factors. Sierra Thompson is a 38 y.o. female who presents with 2 days of pain in her left upper arm as well as 1 day of headache. Patient has any fever. Patient has any sudden onset of headache, maximal headache at onset, neck stiffness, or syncope. She denies any fever or infectious symptoms. While triage note documents the word \"numbness\" in the patient's left upper arm upon further history the patient completely denies this to me. She does report pain in the left upper arm but denies any decreased sensation or absence of sensation. She denies any weakness in her left arm. She reports her symptoms are moderate, constant, and worsening. She denies any trauma or infectious symptoms. HPI    Nursing Notes were reviewed. REVIEW OFSYSTEMS    (2-9 systems for level 4, 10 or more for level 5)     Review of Systems   Constitutional: Negative for appetite change, fever and unexpected weight change. HENT: Negative for facial swelling, trouble swallowing and voice change. Eyes: Negative for visual disturbance. Respiratory: Negative for shortness of breath, wheezing and stridor. Cardiovascular: Negative for chest pain and palpitations. Gastrointestinal: Negative for abdominal pain, nausea and vomiting. Genitourinary: Negative for dysuria and vaginal bleeding. Musculoskeletal: Positive for arthralgias. Negative for neck pain and neck stiffness. Skin: Negative for color change and wound. Neurological: Positive for headaches. Negative for seizures and syncope. Psychiatric/Behavioral: Negative for self-injury and suicidal ideas. Except as noted above the remainder of the review of systems was reviewed and negative. PAST MEDICAL HISTORY   History reviewed. No pertinent past medical history. SURGICAL HISTORY     History reviewed. No pertinent surgical history. CURRENT MEDICATIONS       Previous Medications    No medications on file       ALLERGIES     Patient has no known allergies. FAMILY HISTORY     History reviewed. No pertinent family history. SOCIAL HISTORY       Social History     Socioeconomic History    Marital status: Single     Spouse name: None    Number of children: None    Years of education: None    Highest education level: None   Occupational History    None   Tobacco Use    Smoking status: Current Every Day Smoker     Packs/day: 0.25     Types: Cigarettes    Smokeless tobacco: Current User   Substance and Sexual Activity    Alcohol use: Yes     Comment: Occ    Drug use: None    Sexual activity: None   Other Topics Concern    None   Social History Narrative    None     Social Determinants of Health     Financial Resource Strain:     Difficulty of Paying Living Expenses:    Food Insecurity:     Worried About Running Out of Food in the Last Year:     [address] in the Last Year:    Transportation Needs:     Lack of Transportation (Medical):      Lack of Transportation (Non-Medical):    Physical Activity:     Days of Exercise per Week:     Minutes of Exercise per Session:    Stress:     Feeling of Stress :    Social Connections:     Frequency of Communication with Friends and Family:     Frequency of Social Gatherings with Friends and Family:     Attends [employer] Services:     Active Member of Clubs or Organizations:     Attends Club or Organization Meetings:     Marital Status:    Intimate Partner Violence:     Fear of Current or Ex-Partner:     Emotionally Abused:     Physically Abused:     Sexually Abused:          PHYSICAL EXAM    (up to 7 for level 4, 8 or more for level 5)     ED Triage Vitals [07/07/21 1002]   BP Temp Temp Source Pulse Resp SpO2 Height Weight   (!) 154/91 98.2 °F (36.8 °C) Oral 88 -- 100 % -- 204 lb 5.9 oz (92.7 kg)       Physical Exam  Vitals and nursing note reviewed. Constitutional:       Appearance: She is well-developed. She is not diaphoretic. Comments: Pleasant and cooperative. Nontoxic-appearing. In no acute distress. HENT:      Head: Normocephalic and atraumatic. Right Ear: External ear normal.      Left Ear: External ear normal.   Eyes:      Conjunctiva/sclera: Conjunctivae normal.   Neck:      Vascular: No JVD. Trachea: No tracheal deviation. Comments: Full range of motion of neck with no nuchal rigidity. Cardiovascular:      Rate and Rhythm: Normal rate. Pulmonary:      Effort: Pulmonary effort is normal. No respiratory distress. Breath sounds: Normal breath sounds. No wheezing. Abdominal:      General: There is no distension. Palpations: Abdomen is soft. Tenderness: There is no abdominal tenderness. There is no guarding or rebound. Musculoskeletal:         General: No tenderness or deformity. Normal range of motion. Cervical back: Normal range of motion and neck supple. No rigidity or tenderness. Skin:     General: Skin is warm and dry. Neurological:      Mental Status: She is alert. Cranial Nerves: No cranial nerve deficit. Comments: 5 out of 5  strength equal bilaterally. Sensation intact in axillar, radial, median, ulnar nerve distribution of bilateral upper extremities. Normal gait on own power. Negative Romberg. Normal finger-to-nose. Normal speech and mental status.            EMERGENCY DEPARTMENT COURSE and DIFFERENTIAL DIAGNOSIS/MDM:   Vitals:    Vitals:    07/07/21 1002   BP: (!) 154/91   Pulse: 88   Temp: 98.2 °F (36.8 °C)   TempSrc: Oral   SpO2: 100%   Weight: 204 lb 5.9 oz (92.7 kg)         MDM  Patient is mildly hypertensive but all other vital signs within normal limits. She has no focal neurologic deficits. She is [place] subarachnoid hemorrhage rule negative has no focal symptoms of neurologic deficit on exam.  I discussed further work-up including laboratory evaluation, CT head, or lumbar puncture however extremity decision-making is used to treat the patient with Tylenol, ibuprofen, a work note, and have her follow-up with her primary care doctor or return to the emergency department for 8 hours if her symptoms do not improve for repeat evaluation or sooner if her symptoms worsen. I primarily suspect nonspecific viral illness or nerve impingement at this time and feel tired ibuprofen as well as symptomatic treatment is appropriate. In my differential diagnosis I considered: meningitis, encephalitis, subarachenoid hemorrhage, epidural hematoma, carotid artery dissection, vertebral artery dissection, temporal arteritis, acute glaucoma, venous sinus thrombosis, or intracranial mass lesion as possible causes of this patient's headache. Based on History, Physical Examination and labs/imaging ordered, I estimate there is LOW risk for these etiologies thus I consider the discharge disposition reasonable. We have discussed the diagnosis and risks, and we agree with discharging home to follow-up with their primary doctor. We also discussed returning to the Emergency Department immediately if new or worsening symptoms occur. We have discussed the symptoms which are most concerning (e.g., changing or worsening pain, weakness, vomiting, fever) that necessitate immediate return. Procedures    FINAL IMPRESSION      1.  Acute nonintractable headache, unspecified headache type          DISPOSITION/PLAN   DISPOSITION Decision To Discharge 07/07/2021 11:19:54 AM      PATIENT REFERRED TO:  Stephen Ferreira    In

## 2021-07-07 NOTE — ED NOTES
Pt states was at work 2 days ago started with pain lf upper arm sore to touch c/o numbness lf upper arm     [place], RN  07/07/21 8025

## 2025-07-09 ENCOUNTER — HOSPITAL ENCOUNTER (EMERGENCY)
Age: 40
Discharge: HOME | End: 2025-07-09
Attending: EMERGENCY MEDICINE

## 2025-07-09 ENCOUNTER — APPOINTMENT (OUTPATIENT)
Dept: GENERAL RADIOLOGY | Age: 40
End: 2025-07-09

## 2025-07-09 ENCOUNTER — APPOINTMENT (OUTPATIENT)
Dept: CT IMAGING | Age: 40
End: 2025-07-09

## 2025-07-09 VITALS
DIASTOLIC BLOOD PRESSURE: 97 MMHG | RESPIRATION RATE: 29 BRPM | HEART RATE: 96 BPM | OXYGEN SATURATION: 98 % | SYSTOLIC BLOOD PRESSURE: 151 MMHG | TEMPERATURE: 98.5 F

## 2025-07-09 DIAGNOSIS — E87.6 HYPOKALEMIA: ICD-10-CM

## 2025-07-09 DIAGNOSIS — N10 ACUTE PYELONEPHRITIS: Primary | ICD-10-CM

## 2025-07-09 LAB
ALBUMIN SERPL-MCNC: 3.6 G/DL (ref 3.4–5)
ALBUMIN/GLOB SERPL: 1 {RATIO} (ref 1.1–2.2)
ALP SERPL-CCNC: 58 U/L (ref 40–129)
ALT SERPL-CCNC: 10 U/L (ref 10–40)
ANION GAP SERPL CALCULATED.3IONS-SCNC: 15 MMOL/L (ref 3–16)
AST SERPL-CCNC: 30 U/L (ref 15–37)
BACTERIA URNS QL MICRO: ABNORMAL /HPF
BASOPHILS # BLD: 0.2 K/UL (ref 0–0.2)
BASOPHILS NFR BLD: 0.8 %
BILIRUB SERPL-MCNC: 0.6 MG/DL (ref 0–1)
BILIRUB UR QL STRIP.AUTO: NEGATIVE
BUN SERPL-MCNC: 14 MG/DL (ref 7–20)
CALCIUM SERPL-MCNC: 8.5 MG/DL (ref 8.3–10.6)
CHLORIDE SERPL-SCNC: 99 MMOL/L (ref 99–110)
CLARITY UR: ABNORMAL
CO2 SERPL-SCNC: 19 MMOL/L (ref 21–32)
COLOR UR: YELLOW
CREAT SERPL-MCNC: 1.1 MG/DL (ref 0.6–1.1)
DEPRECATED RDW RBC AUTO: 20 % (ref 12.4–15.4)
EOSINOPHIL # BLD: 0 K/UL (ref 0–0.6)
EOSINOPHIL NFR BLD: 0 %
EPI CELLS #/AREA URNS HPF: ABNORMAL /HPF (ref 0–5)
FLUAV RNA UPPER RESP QL NAA+PROBE: NEGATIVE
FLUBV AG NPH QL: NEGATIVE
GFR SERPLBLD CREATININE-BSD FMLA CKD-EPI: 65 ML/MIN/{1.73_M2}
GLUCOSE SERPL-MCNC: 119 MG/DL (ref 70–99)
GLUCOSE UR STRIP.AUTO-MCNC: NEGATIVE MG/DL
HCG UR QL: NEGATIVE
HCT VFR BLD AUTO: 32.8 % (ref 36–48)
HGB BLD-MCNC: 10.3 G/DL (ref 12–16)
HGB UR QL STRIP.AUTO: ABNORMAL
KETONES UR STRIP.AUTO-MCNC: NEGATIVE MG/DL
LACTATE BLDV-SCNC: 1 MMOL/L (ref 0.4–2)
LEUKOCYTE ESTERASE UR QL STRIP.AUTO: ABNORMAL
LYMPHOCYTES # BLD: 0.4 K/UL (ref 1–5.1)
LYMPHOCYTES NFR BLD: 1.7 %
MCH RBC QN AUTO: 23.2 PG (ref 26–34)
MCHC RBC AUTO-ENTMCNC: 31.5 G/DL (ref 31–36)
MCV RBC AUTO: 73.6 FL (ref 80–100)
MONOCYTES # BLD: 0.5 K/UL (ref 0–1.3)
MONOCYTES NFR BLD: 2.3 %
NEUTROPHILS # BLD: 21.2 K/UL (ref 1.7–7.7)
NEUTROPHILS NFR BLD: 95.2 %
NITRITE UR QL STRIP.AUTO: POSITIVE
PH UR STRIP.AUTO: 6 [PH] (ref 5–8)
PLATELET # BLD AUTO: 191 K/UL (ref 135–450)
PMV BLD AUTO: 8.8 FL (ref 5–10.5)
POTASSIUM SERPL-SCNC: 2.7 MMOL/L (ref 3.5–5.1)
PROT SERPL-MCNC: 7.3 G/DL (ref 6.4–8.2)
PROT UR STRIP.AUTO-MCNC: >=300 MG/DL
RBC # BLD AUTO: 4.45 M/UL (ref 4–5.2)
RBC #/AREA URNS HPF: ABNORMAL /HPF (ref 0–4)
SARS-COV-2 RDRP RESP QL NAA+PROBE: NOT DETECTED
SODIUM SERPL-SCNC: 133 MMOL/L (ref 136–145)
SP GR UR STRIP.AUTO: 1.02 (ref 1–1.03)
TRICHOMONAS #/AREA URNS HPF: ABNORMAL /HPF
UA COMPLETE W REFLEX CULTURE PNL UR: YES
UA DIPSTICK W REFLEX MICRO PNL UR: YES
URN SPEC COLLECT METH UR: ABNORMAL
UROBILINOGEN UR STRIP-ACNC: 1 E.U./DL
WBC # BLD AUTO: 22.2 K/UL (ref 4–11)
WBC #/AREA URNS HPF: >100 /HPF (ref 0–5)

## 2025-07-09 PROCEDURE — 71045 X-RAY EXAM CHEST 1 VIEW: CPT

## 2025-07-09 PROCEDURE — 6360000004 HC RX CONTRAST MEDICATION: Performed by: EMERGENCY MEDICINE

## 2025-07-09 PROCEDURE — 96365 THER/PROPH/DIAG IV INF INIT: CPT

## 2025-07-09 PROCEDURE — 87086 URINE CULTURE/COLONY COUNT: CPT

## 2025-07-09 PROCEDURE — 87804 INFLUENZA ASSAY W/OPTIC: CPT

## 2025-07-09 PROCEDURE — 84703 CHORIONIC GONADOTROPIN ASSAY: CPT

## 2025-07-09 PROCEDURE — 80053 COMPREHEN METABOLIC PANEL: CPT

## 2025-07-09 PROCEDURE — 87186 SC STD MICRODIL/AGAR DIL: CPT

## 2025-07-09 PROCEDURE — 2580000003 HC RX 258: Performed by: EMERGENCY MEDICINE

## 2025-07-09 PROCEDURE — 36415 COLL VENOUS BLD VENIPUNCTURE: CPT

## 2025-07-09 PROCEDURE — 87635 SARS-COV-2 COVID-19 AMP PRB: CPT

## 2025-07-09 PROCEDURE — 74177 CT ABD & PELVIS W/CONTRAST: CPT

## 2025-07-09 PROCEDURE — 96361 HYDRATE IV INFUSION ADD-ON: CPT

## 2025-07-09 PROCEDURE — 96375 TX/PRO/DX INJ NEW DRUG ADDON: CPT

## 2025-07-09 PROCEDURE — 87150 DNA/RNA AMPLIFIED PROBE: CPT

## 2025-07-09 PROCEDURE — 6360000002 HC RX W HCPCS: Performed by: EMERGENCY MEDICINE

## 2025-07-09 PROCEDURE — 6370000000 HC RX 637 (ALT 250 FOR IP): Performed by: EMERGENCY MEDICINE

## 2025-07-09 PROCEDURE — 81001 URINALYSIS AUTO W/SCOPE: CPT

## 2025-07-09 PROCEDURE — 99285 EMERGENCY DEPT VISIT HI MDM: CPT

## 2025-07-09 PROCEDURE — 85025 COMPLETE CBC W/AUTO DIFF WBC: CPT

## 2025-07-09 PROCEDURE — 83605 ASSAY OF LACTIC ACID: CPT

## 2025-07-09 PROCEDURE — 2500000003 HC RX 250 WO HCPCS: Performed by: EMERGENCY MEDICINE

## 2025-07-09 PROCEDURE — 87040 BLOOD CULTURE FOR BACTERIA: CPT

## 2025-07-09 PROCEDURE — 87088 URINE BACTERIA CULTURE: CPT

## 2025-07-09 RX ORDER — IOPAMIDOL 755 MG/ML
100 INJECTION, SOLUTION INTRAVASCULAR
Status: COMPLETED | OUTPATIENT
Start: 2025-07-09 | End: 2025-07-09

## 2025-07-09 RX ORDER — 0.9 % SODIUM CHLORIDE 0.9 %
2000 INTRAVENOUS SOLUTION INTRAVENOUS ONCE
Status: COMPLETED | OUTPATIENT
Start: 2025-07-09 | End: 2025-07-09

## 2025-07-09 RX ORDER — 0.9 % SODIUM CHLORIDE 0.9 %
1000 INTRAVENOUS SOLUTION INTRAVENOUS ONCE
Status: COMPLETED | OUTPATIENT
Start: 2025-07-09 | End: 2025-07-09

## 2025-07-09 RX ORDER — POTASSIUM CHLORIDE 7.45 MG/ML
10 INJECTION INTRAVENOUS ONCE
Status: COMPLETED | OUTPATIENT
Start: 2025-07-09 | End: 2025-07-09

## 2025-07-09 RX ORDER — POTASSIUM CHLORIDE 1500 MG/1
20 TABLET, EXTENDED RELEASE ORAL DAILY
Qty: 30 TABLET | Refills: 0 | Status: SHIPPED | OUTPATIENT
Start: 2025-07-09

## 2025-07-09 RX ORDER — CEFUROXIME AXETIL 250 MG/1
250 TABLET ORAL 2 TIMES DAILY
Qty: 20 TABLET | Refills: 0 | Status: SHIPPED | OUTPATIENT
Start: 2025-07-09 | End: 2025-07-19

## 2025-07-09 RX ORDER — ACETAMINOPHEN 500 MG
1000 TABLET ORAL ONCE
Status: COMPLETED | OUTPATIENT
Start: 2025-07-09 | End: 2025-07-09

## 2025-07-09 RX ORDER — ONDANSETRON 2 MG/ML
4 INJECTION INTRAMUSCULAR; INTRAVENOUS ONCE
Status: COMPLETED | OUTPATIENT
Start: 2025-07-09 | End: 2025-07-09

## 2025-07-09 RX ORDER — KETOROLAC TROMETHAMINE 15 MG/ML
15 INJECTION, SOLUTION INTRAMUSCULAR; INTRAVENOUS ONCE
Status: COMPLETED | OUTPATIENT
Start: 2025-07-09 | End: 2025-07-09

## 2025-07-09 RX ADMIN — POTASSIUM CHLORIDE 10 MEQ: 7.46 INJECTION, SOLUTION INTRAVENOUS at 12:11

## 2025-07-09 RX ADMIN — ACETAMINOPHEN 1000 MG: 500 TABLET ORAL at 11:28

## 2025-07-09 RX ADMIN — SODIUM CHLORIDE 1000 ML: 0.9 INJECTION, SOLUTION INTRAVENOUS at 13:57

## 2025-07-09 RX ADMIN — IOPAMIDOL 100 ML: 755 INJECTION, SOLUTION INTRAVENOUS at 12:40

## 2025-07-09 RX ADMIN — SODIUM CHLORIDE 2000 ML: 0.9 INJECTION, SOLUTION INTRAVENOUS at 11:19

## 2025-07-09 RX ADMIN — KETOROLAC TROMETHAMINE 15 MG: 15 INJECTION, SOLUTION INTRAMUSCULAR; INTRAVENOUS at 12:13

## 2025-07-09 RX ADMIN — POTASSIUM CHLORIDE 10 MEQ: 7.46 INJECTION, SOLUTION INTRAVENOUS at 13:57

## 2025-07-09 RX ADMIN — ONDANSETRON 4 MG: 2 INJECTION, SOLUTION INTRAMUSCULAR; INTRAVENOUS at 11:24

## 2025-07-09 RX ADMIN — WATER 1000 MG: 1 INJECTION INTRAMUSCULAR; INTRAVENOUS; SUBCUTANEOUS at 11:29

## 2025-07-09 ASSESSMENT — PAIN DESCRIPTION - DESCRIPTORS: DESCRIPTORS: ACHING

## 2025-07-09 ASSESSMENT — ENCOUNTER SYMPTOMS
ABDOMINAL PAIN: 1
COLOR CHANGE: 0
STRIDOR: 0
VOICE CHANGE: 0
TROUBLE SWALLOWING: 0
FACIAL SWELLING: 0
SHORTNESS OF BREATH: 0
NAUSEA: 1
WHEEZING: 0
VOMITING: 1

## 2025-07-09 ASSESSMENT — PAIN SCALES - GENERAL
PAINLEVEL_OUTOF10: 8
PAINLEVEL_OUTOF10: 4
PAINLEVEL_OUTOF10: 4
PAINLEVEL_OUTOF10: 8
PAINLEVEL_OUTOF10: 8

## 2025-07-09 ASSESSMENT — PAIN - FUNCTIONAL ASSESSMENT
PAIN_FUNCTIONAL_ASSESSMENT: 0-10
PAIN_FUNCTIONAL_ASSESSMENT: 0-10

## 2025-07-09 ASSESSMENT — PAIN DESCRIPTION - PAIN TYPE: TYPE: ACUTE PAIN

## 2025-07-09 ASSESSMENT — PAIN DESCRIPTION - LOCATION: LOCATION: GENERALIZED

## 2025-07-09 NOTE — ED PROVIDER NOTES
like to attempt p.o. antibiotics and potassium and will come back if symptoms worsen.  While I prefer the patient to be admitted I do feel she understands the risks, benefits, alternatives of her decision of capacity prospective treatment decision making and based on multiple young age, current normal vital signs, inability to tolerate p.o. I do feel her decision to attempt oral antibiotics first is reasonable.  Urine culture sent and patient is prescribed oral antibiotics as well as oral potassium supplementation.  Close primary care follow-up recommended very strict ER return precautions given for fever or worsening symptoms and patient is aware that there is a very real possibility that she would have to return promptly for further IV treatments and admission and that there is a risk of permanent disability or death with delayed treatment.  Patient expresses understanding and agreement with this plan and is discharged home per her request.      FINAL IMPRESSION      1. Acute pyelonephritis    2. Hypokalemia          DISPOSITION/PLAN     DISPOSITION Decision To Discharge 07/09/2025 03:20:27 PM   DISPOSITION CONDITION Stable           PATIENT REFERRED TO:  Stephen Barth    In 2 days      [facility] Emergency Department  [address] [address]  [phone]    If symptoms worsen      DISCHARGE MEDICATIONS:  New Prescriptions    CEFUROXIME (CEFTIN) 250 MG TABLET    Take 1 tablet by mouth 2 times daily for 10 days    POTASSIUM CHLORIDE (KLOR-CON M) 20 MEQ EXTENDED RELEASE TABLET    Take 1 tablet by mouth daily              (Please note that portions of this note were completed with a voice recognition program.  Efforts were made to edit the dictations but occasionally words are mis-transcribed.)    Dariusz Godoy MD (electronically signed)  Attending Emergency Physician           Dariusz Godoy MD  07/09/25 5020

## 2025-07-10 LAB — REPORT: NORMAL

## 2025-07-11 LAB
BACTERIA UR CULT: ABNORMAL
ORGANISM: ABNORMAL

## 2025-07-13 LAB
BACTERIA BLD CULT ORG #2: ABNORMAL
BACTERIA BLD CULT ORG #2: ABNORMAL
BACTERIA BLD CULT: ABNORMAL
ORGANISM: ABNORMAL